# Patient Record
Sex: MALE | Race: WHITE | URBAN - METROPOLITAN AREA
[De-identification: names, ages, dates, MRNs, and addresses within clinical notes are randomized per-mention and may not be internally consistent; named-entity substitution may affect disease eponyms.]

---

## 2018-03-28 ENCOUNTER — OFFICE VISIT (OUTPATIENT)
Dept: BEHAVIORAL/MENTAL HEALTH CLINIC | Age: 83
End: 2018-03-28

## 2018-03-28 VITALS
HEART RATE: 68 BPM | HEIGHT: 72 IN | DIASTOLIC BLOOD PRESSURE: 64 MMHG | BODY MASS INDEX: 21.4 KG/M2 | SYSTOLIC BLOOD PRESSURE: 146 MMHG | WEIGHT: 158 LBS

## 2018-03-28 DIAGNOSIS — F02.818 LATE ONSET ALZHEIMER'S DISEASE WITH BEHAVIORAL DISTURBANCE (HCC): Primary | ICD-10-CM

## 2018-03-28 DIAGNOSIS — G30.1 LATE ONSET ALZHEIMER'S DISEASE WITH BEHAVIORAL DISTURBANCE (HCC): Primary | ICD-10-CM

## 2018-03-28 PROBLEM — C80.1 CANCER (HCC): Status: ACTIVE | Noted: 2018-03-28

## 2018-03-28 PROBLEM — I49.9 ARRHYTHMIA: Status: ACTIVE | Noted: 2018-03-28

## 2018-03-28 PROBLEM — I10 HYPERTENSION: Status: ACTIVE | Noted: 2018-03-28

## 2018-03-28 PROBLEM — F03.90 DEMENTIA (HCC): Status: ACTIVE | Noted: 2018-03-28

## 2018-03-28 RX ORDER — FINASTERIDE 5 MG/1
5 TABLET, FILM COATED ORAL DAILY
COMMUNITY

## 2018-03-28 RX ORDER — DONEPEZIL HYDROCHLORIDE 10 MG/1
10 TABLET, FILM COATED ORAL
COMMUNITY

## 2018-03-28 RX ORDER — QUETIAPINE FUMARATE 25 MG/1
25 TABLET, FILM COATED ORAL 2 TIMES DAILY
Qty: 60 TAB | Refills: 1 | Status: SHIPPED | OUTPATIENT
Start: 2018-03-28 | End: 2018-05-24 | Stop reason: SDUPTHER

## 2018-03-28 RX ORDER — MEMANTINE HYDROCHLORIDE 10 MG/1
TABLET ORAL DAILY
COMMUNITY

## 2018-03-28 RX ORDER — ASPIRIN 81 MG/1
TABLET ORAL DAILY
COMMUNITY

## 2018-03-28 RX ORDER — BISMUTH SUBSALICYLATE 262 MG
1 TABLET,CHEWABLE ORAL DAILY
COMMUNITY

## 2018-03-28 RX ORDER — AMIODARONE HYDROCHLORIDE 100 MG/1
100 TABLET ORAL DAILY
COMMUNITY

## 2018-03-28 RX ORDER — RISPERIDONE 2 MG/1
TABLET, FILM COATED ORAL
Refills: 5 | COMMUNITY
Start: 2018-01-12 | End: 2018-03-28

## 2018-03-28 RX ORDER — QUETIAPINE FUMARATE 25 MG/1
TABLET, FILM COATED ORAL
Refills: 3 | COMMUNITY
Start: 2018-01-09 | End: 2018-03-28 | Stop reason: SDUPTHER

## 2018-03-28 RX ORDER — CLOPIDOGREL BISULFATE 75 MG/1
TABLET ORAL
COMMUNITY

## 2018-03-28 NOTE — PROGRESS NOTES
Ambulatory Initial Psychiatric Evaluation     Chief Complaint:   Chief Complaint   Patient presents with    New Patient        History of Present Illness: Yan Miller is a 80 y.o. , White male who presents with history of dementia for more than 4 years and reasonably in good health, was brought by his  second wife from home, to establish his mental health treatment here. Patient has been having behavioral disturbances or past 2 years which has worsened in the past few months. He is becoming much more agitated, aggressive and more and more paranoid. So far he has been treated by neurologist.  Initially he was treated by Dr. Valeria Brown, neurologist and now by Dr. Francesca Vasquez, neurologist.  Patient is currently on the combination of Aricept, Namenda for more than 4 years, and Seroquel low dose for 2 years and has been recently started on Risperdal for worsening aggression and agitation over past 6-8 weeks. Patient's wife was seen first at her request, while he was being tested by the medical assistant. Wife reported that patient is unaware of the significant cognitive decline and he has been becoming more physically aggressive and agitated. He is also more paranoid about his money and how she is spending his money. Patient had a strong personality to start with and has been aggressive towards her before getting demented in 28 years of that marriage. It appears from the description of the wife that the patient has been very narcissistic and very successful at the physician/urologist.  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  According to the wife patient requires minimal assist with his ADLs, ambulates well without assistance, and is continent of bladder and bowel. He sleeps and eats well. He is minimally active physically and intellectually and he spends a lot of his time in his bedroom playing with his cats and watching some TV.   He is socially intact but does not socialize much. Patient was seen in the presence of his wife. He appeared to be rude towards his wife and did not like when she was correcting him about the details in his answers. Overall pleasant and confused. No gross psychosis or windy noted. He was not very clear what he does from morning to evening at home. Patient tried to keep it together by asking irrelevant questions. He does not have any obsessive thinking or compulsive behaviors. He did not report any nightmares or flashbacks. Patient did not report any illicit drug or alcohol abuse in the past.  He was never a smoker. Scales:  MMSE: 15/30  GDS: 3/15      NEUROPSYCHOLOGICAL TESTING IMPRESSIONS ( 9/14/17) By Maite Forbes PhD :  Overall impression of neuropsychological assessment results are consistent with a notable compromise in cognitive functioning, with limitation manifested across numerous domains when compared to where they demographically-related peer group. Specifically, assessment results suggest impairment in aspects of attention and processing, spatial-relational skills, language skills, and memory(auditory verbal learning and recall, contextual verbal memory and recall, and nonverbal recall). The patient was administered with Gildardo dementia rating scale, a measure designed to assess general cognitive functioning at the lower level of ability. He does score of 84 out of 144 fell within the severely impaired range, with pronounced limitation on subsets stepping attention, initiation/perseveration, conceptualization and memory. In fact, overall, memory problems were rather notable. Specifically, on a word listing and learning memory task, patient's overall learning curve was severely impaired with comparable impairment on the delayed recall trials, even when a recognition format was provided.   Immediate and delayed recall for contextual verbal information and delayed recall of nonverbal materials were severely impaired, as well. From an emotional point of view, the patient denied concerns reflective of an acute depressive upon formal assessment. Records indicate relatively recent onset of anger and aggressive behavior now managed with Seroquel. While even's situational distress can interfere at times with the mental control and effortful processing, the patient's moods status does not appear to be primary factor contributing to the cognitive concerns documented above. Taken collectively, findings suggest that compromised cognitive capacity will likely interfere substantially with the instrumental decision making in daily living, signaling the appropriateness of supervision and assistance by supportive others. Past Psychiatric history:   As per HPI patient has a history of dementia for over 40 years. He never had any mental health problems prior to that. Patient has never been hospitalized for mental health reasons. Patient has never received ECT or [address]. Patient has not been tried on any other medication other than what was described in HPI. Past history of substance use: As per HPI patient has no history of illicit drug or alcohol abuse. Social History: Patient is  second time for 32 years. He has 4 daughters from his first marriage. Patient is a retired physician/urologist.  He retired 20 years ago. 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  Patient does not have any family history of mental health problems. Patient has no history of legal problems or childhood abuse.   Social History     Social History    Marital status:      Spouse name: N/A    Number of children: N/A    Years of education: N/A     Social History Main Topics    Smoking status: Never Smoker    Smokeless tobacco: Never Used    Alcohol use Yes      Comment: occassionally    Drug use: No    Sexual activity: Not Currently     Other Topics Concern    None     Social History Narrative    None Ethnic:   Relationship Status:   Living Situation: With spouse   Employment: retired  Hobbies:  reading  Sexual:  heterosexual    Family History:   History reviewed. No pertinent family history. Past Medical History:   Past Medical History:   Diagnosis Date    Arrhythmia     atrial Fib    Cancer (HCC)     Chronic Myelogenic leukemia    Dementia     Hypertension          Allergies:   No Known Allergies     Medication List:   Current Outpatient Prescriptions   Medication Sig Dispense Refill    clopidogrel (PLAVIX) 75 mg tab Take  by mouth.  aspirin delayed-release 81 mg tablet Take  by mouth daily.  amiodarone (PACERONE) 100 mg tablet Take 100 mg by mouth daily.  multivitamin (ONE A DAY) tablet Take 1 Tab by mouth daily.  finasteride (PROSCAR) 5 mg tablet Take 5 mg by mouth daily.  memantine (NAMENDA) 10 mg tablet Take  by mouth daily.  donepezil (ARICEPT) 10 mg tablet Take 10 mg by mouth nightly.  bosutinib (BOSULIF) 500 mg tab Take  by mouth.  QUEtiapine (SEROQUEL) 25 mg tablet Take 1 Tab by mouth two (2) times a day.  1 tablet at 4 PM and 1 tablet at 10 PM. 60 Tab 1        ROS:  Constitutional: positive for fatigue and weight loss  Eyes: positive for contacts/glasses  Ears, nose, mouth, throat, and face: positive for hearing loss  Respiratory: negative for cough or wheezing  Cardiovascular: negative for chest pain, palpitations  Gastrointestinal: negative for reflux symptoms and constipation  Genitourinary:negative for frequency and urinary incontinence  Musculoskeletal:negative for myalgias and arthralgias  Neurological: positive for memory problems  Behavioral/Psych: positive for agitation, aggressive behavior and behavior problems, negative for SI or HI  Endocrine: negative for diabetic symptoms including polyuria and polydipsia    Psychiatric/Mental Status Examination:     MENTAL STATUS EXAM:  Sensorium  confused, Alert and Oriented x 2 Orientation person and year   Relations cooperative and passive   Eye Contact appropriate   Appearance:  age appropriate and casually dressed   Motor Behavior:  within normal limits   Speech:  normal pitch and normal volume   Vocabulary average   Thought Process: circumstantial, disorganized and tangential   Thought Content free of delusions and free of hallucinations   Suicidal ideations none   Homicidal ideations none   Mood:  euthymic   Affect:  full range   Memory recent  impaired   Memory remote:  impaired   Concentration:  impaired   Abstraction:  concrete   Insight:  limited   Reliability poor   Judgment:  poor       Assessement & Diagnoses: 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 Patient is currently on combination of medication but does not seem to be very effective. Patient is medically stable and has intact ADL functions. 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 Treatment Plan:   1. Medication: discontinue Risperdal, increase Seroquel 25 mg, one tablet at 4 PM and 1 tablet at bedtime, continue Aricept and Namenda in the current dosages. 2. Discussed:  the risks and benefits of the proposed medication  the potential medication side effects  dry mouth, GI disturbance, weight gain, somnolence, tremor  patient given opportunity to ask questions  3. Psychotherapy: none recommended at this time. 4. Medical: with PCP  5.  Education: Wife was educated on dementia related behaviors, medications and prognosis. 6. Return to Clinic: Follow-up Disposition:  Return in about 2 months (around 5/28/2018). The risk versus benefits of treatment were discussed and side effects explained. Patient/spouse with plan. Patient/spouse instructed to call with any side effects.      Time spent with Patient:  30 to 74 minutes    Rylie Theodore MD  3/28/2018

## 2018-05-24 ENCOUNTER — OFFICE VISIT (OUTPATIENT)
Dept: BEHAVIORAL/MENTAL HEALTH CLINIC | Age: 83
End: 2018-05-24

## 2018-05-24 VITALS
BODY MASS INDEX: 20.99 KG/M2 | HEIGHT: 72 IN | SYSTOLIC BLOOD PRESSURE: 117 MMHG | WEIGHT: 155 LBS | DIASTOLIC BLOOD PRESSURE: 52 MMHG | HEART RATE: 74 BPM

## 2018-05-24 DIAGNOSIS — G30.1 LATE ONSET ALZHEIMER'S DISEASE WITH BEHAVIORAL DISTURBANCE (HCC): Primary | ICD-10-CM

## 2018-05-24 DIAGNOSIS — F02.818 LATE ONSET ALZHEIMER'S DISEASE WITH BEHAVIORAL DISTURBANCE (HCC): Primary | ICD-10-CM

## 2018-05-24 RX ORDER — QUETIAPINE FUMARATE 25 MG/1
25 TABLET, FILM COATED ORAL 2 TIMES DAILY
Qty: 60 TAB | Refills: 3 | Status: SHIPPED | OUTPATIENT
Start: 2018-05-24 | End: 2018-06-27 | Stop reason: SDUPTHER

## 2018-05-24 NOTE — MR AVS SNAPSHOT
[facility] [address] 
[phone] Patient: Radha Duran MRN: [id number] Visit Information Date & Time Provider Department Dept. Phone Encounter #  
 5/24/2018 10:30 AM Toribio Weiner, [address] [phone] [id number] Follow-up Instructions Return in about 3 months (around 8/24/2018). Upcoming Health Maintenance Date Due DTaP/Tdap/Td series (1 - Tdap) 8/24/1952 ZOSTER VACCINE AGE 60> 6/24/1991 GLAUCOMA SCREENING Q2Y 8/24/1996 Pneumococcal 65+ High/Highest Risk (1 of 2 - PCV13) 8/24/1996 MEDICARE YEARLY EXAM 3/14/2018 Influenza Age 5 to Adult 8/1/2018 Allergies as of 5/24/2018  Review Complete On: 5/24/2018 By: Toribio Weiner MD  
 No Known Allergies Current Immunizations  Never Reviewed No immunizations on file. Not reviewed this visit You Were Diagnosed With   
  
 Codes Comments Late onset Alzheimer's disease with behavioral disturbance    -  Primary ICD-10-CM: G30.1, F02.81 ICD-9-CM: 331.0, 294.11 Vitals BP Pulse Height(growth percentile) Weight(growth percentile) BMI Smoking Status 117/52 74 6' (1.829 m) 155 lb (70.3 kg) 21.02 kg/m2 Never Smoker Vitals History BMI and BSA Data Body Mass Index Body Surface Area 21.02 kg/m 2 1.89 m 2 Preferred Pharmacy Pharmacy Name Phone [id number] [phone] Your Updated Medication List  
  
   
This list is accurate as of 5/24/18 10:32 AM.  Always use your most recent med list.  
  
  
  
  
 amiodarone 100 mg tablet Commonly known as:  [place] Adal Take 100 mg by mouth daily. aspirin delayed-release 81 mg tablet Take  by mouth daily. BOSULIF 500 mg Tab Generic drug:  bosutinib Take  by mouth. donepezil 10 mg tablet Commonly known as:  ARICEPT  
 Take 10 mg by mouth nightly. finasteride 5 mg tablet Commonly known as:  PROSCAR Take 5 mg by mouth daily. memantine 10 mg tablet Commonly known as:  Reon Pry Take  by mouth daily. multivitamin tablet Commonly known as:  ONE A DAY Take 1 Tab by mouth daily. PLAVIX 75 mg Tab Generic drug:  clopidogrel Take  by mouth. QUEtiapine 25 mg tablet Commonly known as:  SEROquel Take 1 Tab by mouth two (2) times a day. 1 tablet at 4 PM and 1 tablet at 10 PM. Prescriptions Sent to Pharmacy Refills QUEtiapine (SEROQUEL) 25 mg tablet 3 Sig: Take 1 Tab by mouth two (2) times a day. 1 tablet at 4 PM and 1 tablet at 10 PM.  
 Class: Normal  
 Pharmacy: [address] [place] [address], [facility]  #: [phone] Route: Oral  
  
Follow-up Instructions Return in about 3 months (around 8/24/2018). Introducing hospitals & HEALTH SERVICES! Cyndy Sánchez introduces [employer] patient portal. Now you can access parts of your medical record, email your doctor's office, and request medication refills online. 1. In your internet browser, go to [email or web address] 2. Click on the First Time User? Click Here link in the Sign In box. You will see the New Member Sign Up page. 3. Enter your [employer] Access Code exactly as it appears below. You will not need to use this code after youve completed the sign-up process. If you do not sign up before the expiration date, you must request a new code. · [employer] Access Code: [email or web address] Expires: 6/26/2018 11:27 AM 
 
4. Enter the last four digits of your Social Security Number (xxxx) and Date of Birth (mm/dd/yyyy) as indicated and click Submit. You will be taken to the next sign-up page. 5. Create a [employer] ID. This will be your [employer] login ID and cannot be changed, so think of one that is secure and easy to remember. 6. Create a [employer] password. You can change your password at any time. 7. Enter your Password Reset Question and Answer. This can be used at a later time if you forget your password. 8. Enter your e-mail address. You will receive e-mail notification when new information is available in [address] 9. Click Sign Up. You can now view and download portions of your medical record. 10. Click the Download Summary menu link to download a portable copy of your medical information. If you have questions, please visit the Frequently Asked Questions section of the [employer] website. Remember, [employer] is NOT to be used for urgent needs. For medical emergencies, dial 911. Now available from your iPhone and Android! Please provide this summary of care documentation to your next provider. If you have any questions after today's visit, please call [phone].

## 2018-05-24 NOTE — PROGRESS NOTES
Psychiatric Outpatient Progress Note    Account Number:  [de-identified]  Name: Babar Walsh.    SUBJECTIVE:   CHIEF COMPLAINT:  Babar Ryan is a 80 y.o. , White male and was seen today for his first follow-up of psychiatric condition and psychotropic medication management. Pt was brought by his wife, who is his primary caregiver. HPI:    Carla lGynn reports the following psychiatric symptoms:  agitation, anxiety and dementia. The symptoms have been present for years and are of moderate- high severity. The symptoms occur daily. Patient was seen in the presence of his wife. He reported that he is doing good. Denies any physical complaints. He reported that he is sleeping and eating well and taking all his medication as prescribed. He does not appear to have any psychosis or windy. His ADL functions are intact. Cognition is moderately compromised. He ambulates well and his control of his bladder and bowel. Wife, who his primary care provider, appears to be intimidated by the patient and what ever she says is contradicted by the patient. She reported that patient is manageable at home and does not have any significant behavioral issues at this time. He goes to sleep poorly and wakes up in the middle of the night couple of times but is able to go back to sleep and has good rest at night. His weight is stable and he is eating well. Patient's weight is stable, his current BMI is 21. Patient is medically stable is compliant with all his medications. 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 Patient is currently on combination of medication but does not seem to be very effective. Patient is medically stable and has intact ADL functions.   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       Contributing factors include: none    Patient denies SI/HI/SIB. Side Effects:  none      Fam/Soc Hx (from [place] with updates):       REVIEW OF SYSTEMS:  Constitutional: positive for fatigue  Eyes: positive for contacts/glasses  Ears, nose, mouth, throat, and face: positive for hearing loss  Respiratory: negative for cough or asthma  Cardiovascular: negative for chest pain, palpitations  Gastrointestinal: negative for reflux symptoms and constipation  Genitourinary:negative for frequency and urinary incontinence  Musculoskeletal:negative for myalgias and arthralgias  Neurological: positive for memory problems  Behavioral/Psych: positive for poor cognition and anxiety, negative for SI or HI    Visit Vitals    /52    Pulse 74    Ht 6' (1.829 m)    Wt 70.3 kg (155 lb)    BMI 21.02 kg/m2       OBJECTIVE:                 Mental Status exam: WNL except for      Sensorium  Alert and Oriented x 2   Relations cooperative and passive    Eye Contact    appropriate   Appearance:  age appropriate and casually dressed   Motor Behavior/Gait:  within normal limits   Speech:  normal pitch and normal volume   Thought Process: logical   Thought Content free of delusions and free of hallucinations   Suicidal ideations none   Homicidal ideations none   Mood:  euthymic   Affect:  full range   Memory recent  impaired   Memory remote:  impaired   Concentration:  impaired   Abstraction:  concrete   Insight:  fair   Reliability fair   Judgment:  fair       MEDICAL DECISION MAKING  Data: pertinent labs, imaging, medical records and diagnostic tests reviewed and incorporated in diagnosis and treatment plan    No Known Allergies     Current Outpatient Prescriptions   Medication Sig Dispense Refill    QUEtiapine (SEROQUEL) 25 mg tablet Take 1 Tab by mouth two (2) times a day. 1 tablet at 4 PM and 1 tablet at 10 PM. 60 Tab 3    clopidogrel (PLAVIX) 75 mg tab Take  by mouth.  aspirin delayed-release 81 mg tablet Take  by mouth daily.  amiodarone (PACERONE) 100 mg tablet Take 100 mg by mouth daily.  multivitamin (ONE A DAY) tablet Take 1 Tab by mouth daily.  finasteride (PROSCAR) 5 mg tablet Take 5 mg by mouth daily.  memantine (NAMENDA) 10 mg tablet Take  by mouth daily.  donepezil (ARICEPT) 10 mg tablet Take 10 mg by mouth nightly.  bosutinib (BOSULIF) 500 mg tab Take  by mouth. Problems addressed today:    ICD-10-CM ICD-9-CM    1. Late onset Alzheimer's disease with behavioral disturbance G30.1 331.0     F02.81 294.11        Assessment:   Mellisa Cheung.  is a 80 y.o.  male  is responding to treatment. Symptoms are stable. Patient denies SI/HI/SIB. No evidence of AH/VH or delusions. Risk Scoring- chronic illnesses and prescription drug management    Treatment Plan:  1. Medications:          Medication Changes/Adjustments: Continue Seroquel, Aricept and Namenda in the current dosages. Current Outpatient Prescriptions   Medication Sig Dispense Refill    QUEtiapine (SEROQUEL) 25 mg tablet Take 1 Tab by mouth two (2) times a day. 1 tablet at 4 PM and 1 tablet at 10 PM. 60 Tab 3    clopidogrel (PLAVIX) 75 mg tab Take  by mouth.  aspirin delayed-release 81 mg tablet Take  by mouth daily.  amiodarone (PACERONE) 100 mg tablet Take 100 mg by mouth daily.  multivitamin (ONE A DAY) tablet Take 1 Tab by mouth daily.  finasteride (PROSCAR) 5 mg tablet Take 5 mg by mouth daily.  memantine (NAMENDA) 10 mg tablet Take  by mouth daily.       donepezil (ARICEPT) 10 mg tablet Take 10 mg by mouth nightly.  bosutinib (BOSULIF) 500 mg tab Take  by mouth. The following regarding medications was addressed:    (The risks and benefits of the proposed medication; the potential medication side effects ie    dry mouth, weight gain, GI upset, headache; patient given opportunity to ask questions)       2. Counseling and coordination of care including instructions for treatment, risks/benefits, risk factor reduction and patient/family education. He agrees with the plan. Patient instructed to call with any side effects, questions or issues. 3.  Wife was provided supportive counseling for her caregiver stress. PSYCHOTHERAPY:  approx 20 minutes  Type:  Supportive/Cognitive Behavioral/Solution Focused psychotherapy provided  Focus:     Current problems:              Caregiver stress   Dementia   Medical issues    Psychoeducation provided:  Psych medications    Treatment plan reviewed with patient-including diagnosis and medications    Carla Glynn is stable    Follow-up Disposition:  Return in about 3 months (around 8/24/2018).       Arabella Burden MD  5/24/2018

## 2018-06-27 RX ORDER — QUETIAPINE FUMARATE 25 MG/1
TABLET, FILM COATED ORAL
Qty: 180 TAB | Refills: 0 | Status: SHIPPED | OUTPATIENT
Start: 2018-06-27

## 2025-03-31 NOTE — MR AVS SNAPSHOT
[address] [phone] Patient: Rochelle Nova MRN: [id number] ZI Visit Information Date & Time Provider Department Dept. Phone Encounter #  
 3/28/2018 11:00 AM Ralf Mariee MD [facility] [phone] [id number] Follow-up Instructions Return in about 2 months (around 2018). Your Appointments 2018 10:30 AM  
ESTABLISHED PATIENT with Ralf Mariee MD  
[address] [place] [address] [facility]) Appt Note: 2 month follow-up [address] [facility] [address] [id number] [address] [facility] Upcoming Health Maintenance Date Due DTaP/Tdap/Td series (1 - Tdap) 1952 ZOSTER VACCINE AGE 60> 1991 GLAUCOMA SCREENING Q2Y 1996 Pneumococcal 65+ Low/Medium Risk (1 of 2 - PCV13) 1996 Influenza Age 5 to Adult 2017 MEDICARE YEARLY EXAM 3/14/2018 Allergies as of 3/28/2018  Review Complete On: 3/28/2018 By: Ralf Mariee MD  
 No Known Allergies Current Immunizations  Never Reviewed No immunizations on file. Not reviewed this visit You Were Diagnosed With   
  
 Codes Comments Late onset Alzheimer's disease with behavioral disturbance    -  Primary ICD-10-CM: G30.1, F02.81 ICD-9-CM: 331.0, 294.11 Cancer [facility])     ICD-10-CM: C80.1 ICD-9-CM: 199.1 Chronic atrial fibrillation (HCC)     ICD-10-CM: Z39.0 ICD-9-CM: 427.31 Vitals BP Pulse Height(growth percentile) Weight(growth percentile) BMI Smoking Status 146/64 68 6' (1.829 m) 158 lb (71.7 kg) 21.43 kg/m2 Never Smoker Vitals History BMI and BSA Data Body Mass Index Body Surface Area  
 21.43 kg/m 2 1.91 m 2 Preferred Pharmacy Pharmacy Name Phone [id number] [address]aquez, [address] [phone] Your Updated Medication List  
  
   
This list is accurate as of 3/28/18 11:27 AM.  Always use your most recent med list.  
  
  
  
  
 amiodarone 100 mg tablet Commonly known as:  Bandar Corn Take 100 mg by mouth daily. aspirin delayed-release 81 mg tablet Take  by mouth daily. BOSULIF 500 mg Tab Generic drug:  bosutinib Take  by mouth. donepezil 10 mg tablet Commonly known as:  ARICEPT Take 10 mg by mouth nightly. finasteride 5 mg tablet Commonly known as:  PROSCAR Take 5 mg by mouth daily. memantine 10 mg tablet Commonly known as:  Dean [place] Take  by mouth daily. multivitamin tablet Commonly known as:  ONE A DAY Take 1 Tab by mouth daily. PLAVIX 75 mg Tab Generic drug:  clopidogrel Take  by mouth. QUEtiapine 25 mg tablet Commonly known as:  SEROquel Take 1 Tab by mouth two (2) times a day. 1 tablet at 4 PM and 1 tablet at 10 PM. Prescriptions Printed Refills QUEtiapine (SEROQUEL) 25 mg tablet 1 Sig: Take 1 Tab by mouth two (2) times a day. 1 tablet at 4 PM and 1 tablet at 10 PM.  
 Class: Print Route: Oral  
  
Follow-up Instructions Return in about 2 months (around 5/28/2018). [facility] SERVICES! Michael Ladonna introduces [employer] patient portal. Now you can access parts of your medical record, email your doctor's office, and request medication refills online. 1. In your internet browser, go to [email or web address]. [email or web address] 2. Click on the First Time User? Click Here link in the Sign In box. You will see the New Member Sign Up page. 3. Enter your [employer] Access Code exactly as it appears below. You will not need to use this code after youve completed the sign-up process. If you do not sign up before the expiration date, you must request a new code. · [employer] Access Code: [email or web address] Expires: 6/26/2018 11:27 AM 
 
4. Enter the last four digits of your Social Security Number (xxxx) and Date of Birth (mm/dd/yyyy) as indicated and click Submit. You will be taken to the next sign-up page. 5. Create a [employer] ID. This will be your [employer] login ID and cannot be changed, so think of one that is secure and easy to remember. 6. Create a [employer] password. You can change your password at any time. 7. Enter your Password Reset Question and Answer. This can be used at a later time if you forget your password. 8. Enter your e-mail address. You will receive e-mail notification when new information is available in [address] 9. Click Sign Up. You can now view and download portions of your medical record. 10. Click the Download Summary menu link to download a portable copy of your medical information. If you have questions, please visit the Frequently Asked Questions section of the [employer] website. Remember, [employer] is NOT to be used for urgent needs. For medical emergencies, dial 911. Now available from your iPhone and Android! Please provide this summary of care documentation to your next provider. If you have any questions after today's visit, please call [phone]. 68